# Patient Record
Sex: MALE | Race: WHITE | NOT HISPANIC OR LATINO | Employment: FULL TIME | URBAN - METROPOLITAN AREA
[De-identification: names, ages, dates, MRNs, and addresses within clinical notes are randomized per-mention and may not be internally consistent; named-entity substitution may affect disease eponyms.]

---

## 2021-06-22 ENCOUNTER — HOSPITAL ENCOUNTER (EMERGENCY)
Facility: HOSPITAL | Age: 36
Discharge: HOME | End: 2021-06-22
Admitting: EMERGENCY MEDICINE

## 2021-06-22 VITALS
WEIGHT: 179.68 LBS | SYSTOLIC BLOOD PRESSURE: 139 MMHG | DIASTOLIC BLOOD PRESSURE: 98 MMHG | HEIGHT: 72 IN | TEMPERATURE: 98.2 F | OXYGEN SATURATION: 97 % | HEART RATE: 70 BPM | RESPIRATION RATE: 17 BRPM | BODY MASS INDEX: 24.34 KG/M2

## 2021-06-22 DIAGNOSIS — R21 RASH AND NONSPECIFIC SKIN ERUPTION: Primary | ICD-10-CM

## 2021-06-22 PROCEDURE — 99282 EMERGENCY DEPT VISIT SF MDM: CPT

## 2021-06-22 RX ORDER — TRIAMCINOLONE ACETONIDE 1 MG/G
CREAM TOPICAL 3 TIMES DAILY
Qty: 15 G | Refills: 0 | Status: SHIPPED | OUTPATIENT
Start: 2021-06-22

## 2021-06-22 NOTE — ED NOTES
Patient has open and scabbed areas between toes on both feet. Has fine red rash on both feet. First noticed areas about 1 week ago     Hannah Sandoval RN  06/22/21 1014

## 2021-06-22 NOTE — DISCHARGE INSTRUCTIONS
Keep feet clean and dry.  Apply medication as directed for the next 3 to 5 days.    Follow-up with podiatry as listed below for further evaluation and management.    Follow-up with your primary care provider in 3-5 days.  If you do not have a primary care provider call [phone] for help in finding one, or you may follow up with [facility] at [phone].    Return to ED for any new or worsening symptoms

## 2021-06-22 NOTE — ED PROVIDER NOTES
"Subjective   Patient is a 35-year-old male who presents with complaints of a rash on his foot that has been going on for about a week.  Patient states it started on his right foot and now is on his left itchy painful 4/10 severity.  Patient states it started out as \"little red dots and now there is blisters that are opening in between my toes\".  Patient denies any new soaps lotions medications or detergent.  Patient states no one else in the household has similar symptoms.  Patient states he tried using Epson salt soaks with minimal relief.  Denies any fever body aches or chills chest pain or shortness of breath.  He does report the blisters have opened and have had some clear drainage no purulent drainage.  He reports some swelling in his right foot states is more painful in his right than his left.  No insect or tick bites.  Patient states he is often in the [place] behind their house fishing.  Patient denies any history of IV drug use.  States tried taking ibuprofen with minimal relief of his symptoms patient states he takes no other medications and is not a diabetic.  No chest pain or shortness of breath.  Reports of intermittent paresthesias of his feet at times.          Review of Systems   Constitutional: Negative.    HENT: Negative.    Eyes: Negative for photophobia and visual disturbance.   Respiratory: Negative.    Cardiovascular: Negative.    Gastrointestinal: Negative for abdominal distention, abdominal pain, diarrhea, nausea and vomiting.   Genitourinary: Negative for decreased urine volume, difficulty urinating, dysuria, flank pain, frequency, hematuria and urgency.   Musculoskeletal: Negative for arthralgias, back pain, gait problem, joint swelling, myalgias, neck pain and neck stiffness.   Skin: Positive for rash.   Neurological: Negative.    Hematological: Negative.        History reviewed. No pertinent past medical history.    No Known Allergies    Past Surgical History:   Procedure Laterality Date "   • SHOULDER SURGERY         History reviewed. No pertinent family history.    Social History     Socioeconomic History   • Marital status:      Spouse name: Not on file   • Number of children: Not on file   • Years of education: Not on file   • Highest education level: Not on file   Tobacco Use   • Smoking status: Current Every Day Smoker   Substance and Sexual Activity   • Alcohol use: Yes   • Drug use: Never   • Sexual activity: Defer           Objective   Physical Exam  Vitals and nursing note reviewed.   Constitutional:       General: He is not in acute distress.     Appearance: Normal appearance. He is well-developed. He is not ill-appearing, toxic-appearing or diaphoretic.   HENT:      Head: Normocephalic and atraumatic.      Mouth/Throat:      Mouth: Mucous membranes are moist.      Pharynx: Oropharynx is clear.   Eyes:      General: No scleral icterus.     Extraocular Movements: Extraocular movements intact.      Pupils: Pupils are equal, round, and reactive to light.   Cardiovascular:      Rate and Rhythm: Normal rate and regular rhythm.      Pulses: Normal pulses.      Heart sounds: No murmur heard.   No friction rub. No gallop.    Pulmonary:      Effort: Pulmonary effort is normal. No tachypnea or accessory muscle usage.      Breath sounds: No decreased breath sounds, wheezing, rhonchi or rales.   Chest:      Chest wall: No mass, deformity, tenderness or crepitus.   Musculoskeletal:      Comments: Peripheral pulses are intact compartments are soft of bilateral lower extremities.  Negative Collado test negative Homans' sign.  Bilateral foot and ankle with flexion extension inversion eversion no significant pain.   Skin:     General: Skin is warm.      Capillary Refill: Capillary refill takes less than 2 seconds.      Findings: Rash present.      Comments: Some erythematous blanchable pruritic papules noted along the foot with some open blisters noted in between the toes with some clear drainage  "with some overlying crust.  No surrounding erythema or cellulitic changes noted of the legs bilaterally the rash does not extend past the foot.    Neurological:      Mental Status: He is alert and oriented to person, place, and time.   Psychiatric:         Mood and Affect: Mood normal.         Behavior: Behavior normal.         Procedures           ED Course    /86   Pulse 77   Temp 98.1 °F (36.7 °C)   Resp 16   Ht 182.9 cm (72\")   Wt 81.5 kg (179 lb 10.8 oz)   SpO2 99%   BMI 24.37 kg/m²   Medications - No data to display  Labs Reviewed - No data to display  No radiology results for the last day                                         MDM  Number of Diagnoses or Management Options  Rash and nonspecific skin eruption  Diagnosis management comments: Chart Review:  Comorbidity: As per past medical history  Disposition/Treatment:  Appropriate PPE was worn during exam and throughout all encounters with the patient.  When the ED patient was afebrile and appeared nontoxic there is no cellulitic changes noted of the bilateral lower extremities.  Peripheral pulses are intact and compartments are soft.  No necrotizing fasciitis noted.  Etiology of patient's rash is unknown does not look like insect bites or scabies and no other family member has similar symptoms he has had no new lotions or medications patient will be given triamcinolone cream and advised to follow podiatry for further evaluation and management.  He was advised to keep feet dry and clean.  Findings were discussed the patient was understanding discharge along signs symptoms return to the ED.  Patient stable time of discharge and agree with plan.      Final diagnoses:   Rash and nonspecific skin eruption       ED Disposition  ED Disposition     ED Disposition Condition Comment    Discharge Stable           [facility] EMERGENCY DEPARTMENT  [address] [facility] [address] [phone]  Go to   If symptoms worsen    PATIENT " [facility] - [facility]  [facility] [address]  [phone]  Call   If you do not have a primary care provider    RAY Borrero DPM  [address] [place] [id number] [phone]    Schedule an appointment as soon as possible for a visit            Medication List      New Prescriptions    triamcinolone 0.1 % cream  Commonly known as: KENALOG  Apply  topically to the appropriate area as directed 3 (Three) Times a Day.           Where to Get Your Medications      You can get these medications from any pharmacy    Bring a paper prescription for each of these medications  · triamcinolone 0.1 % cream          Corinne Wray PA  06/22/21 1958       Corinne Wray PA  06/22/21 2002

## 2021-06-29 ENCOUNTER — TELEPHONE (OUTPATIENT)
Dept: ORTHOPEDICS | Facility: OTHER | Age: 36
End: 2021-06-29

## 2021-07-01 ENCOUNTER — OFFICE VISIT (OUTPATIENT)
Dept: PODIATRY | Facility: CLINIC | Age: 36
End: 2021-07-01

## 2021-07-01 VITALS — HEIGHT: 72 IN | WEIGHT: 176 LBS | BODY MASS INDEX: 23.84 KG/M2

## 2021-07-01 DIAGNOSIS — L30.1 DYSHIDROTIC ECZEMA: ICD-10-CM

## 2021-07-01 DIAGNOSIS — B35.3 TINEA PEDIS OF BOTH FEET: Primary | ICD-10-CM

## 2021-07-01 PROCEDURE — 99203 OFFICE O/P NEW LOW 30 MIN: CPT | Performed by: PODIATRIST

## 2021-07-01 RX ORDER — CLOTRIMAZOLE AND BETAMETHASONE DIPROPIONATE 10; .64 MG/G; MG/G
CREAM TOPICAL 2 TIMES DAILY
Qty: 45 G | Refills: 0 | Status: SHIPPED | OUTPATIENT
Start: 2021-07-01

## 2021-07-02 RX ORDER — CLOTRIMAZOLE AND BETAMETHASONE DIPROPIONATE 10; .64 MG/G; MG/G
CREAM TOPICAL 2 TIMES DAILY
Qty: 45 G | Refills: 0 | Status: SHIPPED | OUTPATIENT
Start: 2021-07-02

## 2021-07-02 NOTE — PROGRESS NOTES
07/01/2021  Foot and Ankle Surgery - New Patient   Provider: Dr. Mikie Borrero DPM  Location: [facility]    Subjective:  Morgan Mñuiz is a 35 y.o. male.     Chief Complaint   Patient presents with   • Left Foot - Skin Problem   • Right Foot - Skin Problem       HPI: Patient is a 35-year-old male that presents with issues involving both feet.  He complains of dry skin with intermittent maceration and small blisters.  He states that the rash has caused some itching and burning.  He states that the symptoms are somewhat improved at this time.  He has not had these issues in the past.  He does complain that his feet do sweat throughout the day.  He has tried multiple socks changes during his work shifts.  He has not had any substantial signs of infection.  He denies any other issues involving his feet.    No Known Allergies    History reviewed. No pertinent past medical history.    Past Surgical History:   Procedure Laterality Date   • SHOULDER SURGERY         History reviewed. No pertinent family history.    Social History     Socioeconomic History   • Marital status:      Spouse name: Not on file   • Number of children: Not on file   • Years of education: Not on file   • Highest education level: Not on file   Tobacco Use   • Smoking status: Current Every Day Smoker   Substance and Sexual Activity   • Alcohol use: Yes   • Drug use: Never   • Sexual activity: Defer        Current Outpatient Medications on File Prior to Visit   Medication Sig Dispense Refill   • triamcinolone (KENALOG) 0.1 % cream Apply  topically to the appropriate area as directed 3 (Three) Times a Day. 15 g 0     No current facility-administered medications on file prior to visit.       Review of Systems:  General: Denies fever, chills, fatigue, and weakness.  Eyes: Denies vision loss, blurry vision, and excessive redness.  ENT: Denies hearing issues and difficulty swallowing.  Cardiovascular: Denies palpitations, chest pain,  "or syncopal episodes.  Respiratory: Denies shortness of breath, wheezing, and coughing.  GI: Denies abdominal pain, nausea, and vomiting.   : Denies frequency, hematuria, and urgency.  Musculoskeletal: Denies muscle cramps, joint pains, and stiffness.  Derm: + Dry skin and rash to both feet  Neuro: Denies headaches, numbness, loss of coordination, and tremors.  Psych: Denies anxiety and depression.  Endocrine: Denies temperature intolerance and changes in appetite.  Heme: Denies bleeding disorders or abnormal bruising.     Objective   Ht 182.9 cm (72\")   Wt 79.8 kg (176 lb)   BMI 23.87 kg/m²     Foot/Ankle Exam:       General:   Appearance: appears stated age and healthy    Orientation: AAOx3    Affect: appropriate    Gait: unimpaired      VASCULAR      Right Foot Vascularity   Normal vascular exam    Dorsalis pedis:  2+  Posterior tibial:  2+  Skin Temperature: warm    Edema Grading:  None  CFT:  < 3 seconds  Pedal Hair Growth:  Present  Varicosities: none       Left Foot Vascularity   Normal vascular exam    Dorsalis pedis:  2+  Posterior tibial:  2+  Skin Temperature: warm    Edema Grading:  None  CFT:  < 3 seconds  Pedal Hair Growth:  Present  Varicosities: none        NEUROLOGIC     Right Foot Neurologic   Light touch sensation:  Normal  Hot/Cold sensation: normal    Achilles reflex:  2+     Left Foot Neurologic   Light touch sensation:  Normal  Hot/cold sensation: normal    Achilles reflex:  2+     MUSCULOSKELETAL      Right Foot Musculoskeletal   Ecchymosis:  None  Tenderness: none    Arch:  Normal     Left Foot Musculoskeletal   Ecchymosis:  None  Tenderness: none    Arch:  Normal     MUSCLE STRENGTH     Right Foot Muscle Strength   Normal strength    Foot dorsiflexion:  5  Foot plantar flexion:  5  Foot inversion:  5  Foot eversion:  5     Left Foot Muscle Strength   Normal strength    Foot dorsiflexion:  5  Foot plantar flexion:  5  Foot inversion:  5  Foot eversion:  5     DERMATOLOGIC     Right Foot " Dermatologic   Skin: dry skin, skin changes and tinea    Skin: no right foot cellulitis and no right foot ulcer       Left Foot Dermatologic   Skin: dry skin, skin changes and tinea    Skin: no left foot cellulitis and no left foot ulcer        Right Foot Additional Comments Patient does have multiple areas of mild erythematous skin and plaque-like lesion.  No iza signs of infection are noted.  Mild areas of crusting.      Assessment/Plan   Diagnoses and all orders for this visit:    1. Tinea pedis of both feet (Primary)    2. Dyshidrotic eczema    Other orders  -     clotrimazole-betamethasone (LOTRISONE) 1-0.05 % cream; Apply  topically to the appropriate area as directed 2 (Two) Times a Day. Apply to affected area twice daily  Dispense: 45 g; Refill: 0  -     clotrimazole-betamethasone (LOTRISONE) 1-0.05 % cream; Apply  topically to the appropriate area as directed 2 (Two) Times a Day. Apply to affected area twice daily  Dispense: 45 g; Refill: 0      Patient presents with issues involving both feet.  He does complain of rash with intermittent areas of maceration, itching, redness, and crusting.  Symptoms are better today.  I explained his findings could be consistent with dyshidrotic eczema and/or tinea pedis.  I have recommended that we proceed with topical Lotrisone.  We did discuss twice daily applications and observation.  We also discussed the importance of proper moisture balance.  I have asked that he monitor closely and return in 2 weeks for reevaluation.    No orders of the defined types were placed in this encounter.       Note is dictated utilizing voice recognition software. Unfortunately this leads to occasional typographical errors. I apologize in advance if the situation occurs. If questions occur please do not hesitate to call our office.